# Patient Record
Sex: FEMALE | Race: WHITE
[De-identification: names, ages, dates, MRNs, and addresses within clinical notes are randomized per-mention and may not be internally consistent; named-entity substitution may affect disease eponyms.]

---

## 2017-02-20 ENCOUNTER — HOSPITAL ENCOUNTER (EMERGENCY)
Dept: HOSPITAL 61 - ER | Age: 47
Discharge: HOME | End: 2017-02-20
Payer: COMMERCIAL

## 2017-02-20 VITALS — DIASTOLIC BLOOD PRESSURE: 78 MMHG | SYSTOLIC BLOOD PRESSURE: 118 MMHG

## 2017-02-20 VITALS — WEIGHT: 150 LBS | BODY MASS INDEX: 24.99 KG/M2 | HEIGHT: 65 IN
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
## 2017-02-20 DIAGNOSIS — S06.0X9A: Primary | ICD-10-CM

## 2017-02-20 DIAGNOSIS — M25.532: ICD-10-CM
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
## 2017-02-20 DIAGNOSIS — N39.0: ICD-10-CM
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
## 2017-02-20 DIAGNOSIS — E03.9: ICD-10-CM

## 2017-02-20 DIAGNOSIS — Z90.710: ICD-10-CM

## 2017-02-20 DIAGNOSIS — S63.502A: ICD-10-CM

## 2017-02-20 LAB
BACTERIA #/AREA URNS HPF: (no result) /HPF
BILIRUB UR QL STRIP: NEGATIVE
GLUCOSE UR STRIP-MCNC: NEGATIVE MG/DL
NITRITE UR QL STRIP: POSITIVE
PH UR STRIP: 6 [PH]
PROT UR STRIP-MCNC: NEGATIVE MG/DL
RBC #/AREA URNS HPF: (no result) /HPF (ref 0–2)
SP GR UR STRIP: 1.01
SQUAMOUS #/AREA URNS LPF: (no result) /LPF
UROBILINOGEN UR-MCNC: 0.2 MG/DL

## 2017-02-20 PROCEDURE — 70450 CT HEAD/BRAIN W/O DYE: CPT

## 2017-02-20 PROCEDURE — 81025 URINE PREGNANCY TEST: CPT

## 2017-02-20 PROCEDURE — 73110 X-RAY EXAM OF WRIST: CPT

## 2017-02-20 PROCEDURE — 87186 SC STD MICRODIL/AGAR DIL: CPT

## 2017-02-20 PROCEDURE — 81001 URINALYSIS AUTO W/SCOPE: CPT

## 2017-02-20 PROCEDURE — 70486 CT MAXILLOFACIAL W/O DYE: CPT

## 2017-02-20 PROCEDURE — 87086 URINE CULTURE/COLONY COUNT: CPT

## 2017-02-20 NOTE — RAD
Left wrist, 3 views, 2/20/2017:



History: Fall, pain



No fracture or dislocation is identified. There is mild subcutaneous edema.



IMPRESSION: No acute bony abnormality is detected.

## 2017-02-20 NOTE — PHYS DOC
Past Medical History


Past Medical History:  Hypothyroid


Past Surgical History:  Hysterectomy, Other


Additional Past Surgical Histo:  Thyroidectomy.


Alcohol Use:  None


Drug Use:  None





Adult General


Chief Complaint


Chief Complaint:  FACE PROBLEM





HPI


HPI


Patient is a 46  year old female presents emergency department stating that she 

was going up her stairs at home last week when she fell hitting the left side 

of her face. She believes she had lost consciousness as she does not remember 

picking up things and making it back into the house. Patient states she's 

continued to have headaches. She states that she's had bruising to the left 

side of her face. She's been taken ibuprofen for the pain and discomfort. She 

denies any use of blood thinners. Patient denies any neck pain or discomfort. 

She does state that she has some urinary frequency urgency and pain with 

urination. She is also claiming that she is having left wrist pain and 

discomfort. Patient denies placing ice packs on the left wrist. Patient denies 

any vaginal discharge. Patient denies any fever, chills or any nausea vomiting.





Review of Systems


Review of Systems





Constitutional: Denies fever or chills []


Eyes: Denies change in visual acuity, redness, or eye pain []


HENT: Denies nasal congestion or sore throat. Bruising left side of face


Respiratory: Denies cough or shortness of breath []


Cardiovascular: No additional information not addressed in HPI []


GI: Denies abdominal pain, nausea, vomiting, bloody stools or diarrhea []


:  dysuria denies hematuria []


Musculoskeletal: Denies back pain or joint pain []


Integument: Denies rash or skin lesions []


Neurologic:  headache, denies focal weakness or sensory changes []





Allergies


Allergies





 Allergies








Coded Allergies Type Severity Reaction Last Updated Verified


 


  No Known Drug Allergies    8/10/15 No











Physical Exam


Physical Exam





Constitutional: Well developed, well nourished, no acute distress, non-toxic 

appearance. []


HENT: Normocephalic, atraumatic, bilateral external ears normal, oropharynx 

moist, no oral exudates, nose normal. Bilateral tympanic membranes appear to be 

normal. Patient is able to open and close her mouth without difficulty.


Eyes: PERRLA, EOMI, conjunctiva normal, no discharge. [] 


Neck: Normal range of motion, no tenderness, supple, no stridor. [] 


Cardiovascular:Heart rate regular rhythm, no murmur []


Lungs & Thorax:  Bilateral breath sounds clear to auscultation []


Skin: Warm, dry, no erythema, no rash. [] 


Back: No tenderness] 


Extremities: Left wrist tenderness, no cyanosis, no clubbing, ROM intact, no 

edema. Patient was noted to have left wrist tenderness on the radial side. No 

bruising discoloration or deformity noted. Peripheral pulses 2+ cap refill 

brisk less than 2 seconds.


Neurologic: Alert and oriented X 3, normal motor function, normal sensory 

function, no focal deficits noted. []


Psychologic: Affect normal, judgement normal, mood normal. []





Current Patient Data


Vital Signs





 Vital Signs








  Date Time  Temp Pulse Resp B/P Pulse Ox O2 Delivery O2 Flow Rate FiO2


 


17 13:20 98.0 61 16  100 Room Air  





 98.0       








Lab Values





 Laboratory Tests








Test


  17


13:30 17


13:58


 


Urine Color Yellow   


 


Urine Clarity Clear   


 


Urine pH 6.0   


 


Urine Specific Gravity 1.010   


 


Urine Protein


  Negativemg/dL


(NEG-TRACE) 


 


 


Urine Glucose (UA)


  Negativemg/dL


(NEG) 


 


 


Urine Ketones (Stick)


  Negativemg/dL


(NEG) 


 


 


Urine Blood Small (NEG)   


 


Urine Nitrite


  Positive (NEG)


  


 


 


Urine Bilirubin


  Negative (NEG)


  


 


 


Urine Urobilinogen Dipstick


  0.2mg/dL (0.2


mg/dL) 


 


 


Urine Leukocyte Esterase Large (NEG)   


 


Urine RBC Occ/HPF (0-2)   


 


Urine WBC


  11-20/HPF


(0-4) 


 


 


Urine Squamous Epithelial


Cells Occ/LPF  


  


 


 


Urine Bacteria


  Many/HPF


(0-FEW) 


 


 


POC Urine HCG, Qualitative


  


  Hcg negative


(Negative)











EKG


EKG


[]





Radiology/Procedures


Radiology/Procedures


[facility]


 [address] [place] [address]


 [phone]


 


 IMAGING REPORT





 Signed





PATIENT: ROWAN ECHOLS ACCOUNT: [id number] MRN#: [id number]


: 1970 LOCATION: ER AGE: 46


SEX: F EXAM DT: 17 ACCESSION#: [id number]


STATUS: REG ER ORD. PHYSICIAN: NICCI LEE NP 


REASON: fell on steps last week bruising to left side of face


PROCEDURE: HEAD AND MAXILLOFACIAL WO

















PQRS Compliance Statement:





One or more of the following individualized dose reduction techniques were


utilized for this examination:


1. Automated exposure control


2. Adjustment of the mA and/or kV according to patient size


3. Use of iterative reconstruction technique








CT of the head without contrast, 2017:





History: Fall, head trauma, facial bruising





Comparison is made to a study from 2018. The ventricles are within normal


limits in size. There is no shift of the midline structures. There is no


evidence of acute intracranial hemorrhage or mass effect.





IMPRESSION: No acute intracranial abnormality is detected.











CT of the facial bones without contrast, 2017:





Noncontrast scans were obtained with multiplanar reconstructions produced.





No fracture or dislocation is identified. No free fluid is present in the


sinuses. There is mild mucosal thickening along the floor of the left


maxillary sinus. The orbital contents are unremarkable.





IMPRESSION: No significant facial bone abnormality is detected.

















DICTATED and SIGNED BY:     MORITZ,RICK S MD


DATE:     17 1440





CC: NICCI LEE NP; DAILY LUU MD ~


[address] [address]


 [phone]


 


 IMAGING REPORT





 Signed





PATIENT: ROWAN ECHOLS ACCOUNT: [id number] MRN#: [id number]


: 1970 LOCATION: ER AGE: 46


SEX: F EXAM DT: 17 ACCESSION#: [id number]


STATUS: REG ER ORD. PHYSICIAN: NICCI LEE NP 


REASON: fell on steps last week, hurts on and off.


PROCEDURE: WRIST 3V LEFT











Left wrist, 3 views, 2017:





History: Fall, pain





No fracture or dislocation is identified. There is mild subcutaneous edema.





IMPRESSION: No acute bony abnormality is detected.

















DICTATED and SIGNED BY:     MORITZ,RICK S MD


DATE:     17 1405





CC: NICCI LEE NP; DAILY LUU MD ~


[]





Course & Med Decision Making


Course & Med Decision Making


Pertinent Labs and Imaging studies reviewed. (See chart for details)


CT scan negative, left wrist negative. Urine positive for UTI. Patient will be 

placed on macrobid. Recommended plenty of water, cranberry juice, avoid 

cranberry juice cocktail, carbonated beverages, caffeine. alcohol and citrus 

fruits as these are consider irritants to the bladder. Followup with primary 

care provider in 5-7 days/Signs and symptoms to return to the emergency 

department has been provided. Patient agrees with discharge instructions, 

treatment regimen and followup recommendations.


[]





Dragon Disclaimer


Dragon Disclaimer


This electronic medical record was generated, in whole or in part, using a 

voice recognition dictation system.





Departure


Departure


Impression:  


 Primary Impression:  


 Closed head injury with concussion


 Additional Impressions:  


 Left wrist sprain


 Urinary tract infection


Disposition:  01 HOME, SELF-CARE


Condition:  STABLE


Referrals:  


DAILY LUU MD (PCP)


Patient Instructions:  Concussion and Brain Injury, Easy-to-Read, Head Injury, 

Adult, Easy-to-Read, Urinary Tract Infection, Easy-to-Read, Wrist Pain, Easy-to-

Read





Additional Instructions:


Activity as tolerated


Medication as prescribed


Tylenol or Ibuprofen for fever, chills, or generalized body aches


Drink plenty of water and cranberry juice


Avoid cranberry juice cocktail, carbonated beverages, caffeine, alcohol and 

citrus fruits as these are considered to be irritants to the bladder


Followup with your primary care provider in 5-7 days


Return to emergency department as needed for signs and symptoms that become 

worse.


Scripts


Nitrofurantoin Monohyd/M-Cryst (Macrobid 100 Mg Capsule)100 Mg Capsule1 Cap PO 

BID #14 CAP


   Prov:NICCI LEE NP         17





Problem Qualifiers








NICCI LEE NP 2017 13:48

## 2017-02-20 NOTE — RAD
PQRS Compliance Statement:



One or more of the following individualized dose reduction techniques were

utilized for this examination:

1. Automated exposure control

2. Adjustment of the mA and/or kV according to patient size

3. Use of iterative reconstruction technique





CT of the head without contrast, 2/20/2017:



History: Fall, head trauma, facial bruising



Comparison is made to a study from 7/18/2018. The ventricles are within normal

limits in size. There is no shift of the midline structures. There is no

evidence of acute intracranial hemorrhage or mass effect.



IMPRESSION: No acute intracranial abnormality is detected.







CT of the facial bones without contrast, 2/20/2017:



Noncontrast scans were obtained with multiplanar reconstructions produced.



No fracture or dislocation is identified. No free fluid is present in the

sinuses. There is mild mucosal thickening along the floor of the left

maxillary sinus. The orbital contents are unremarkable.



IMPRESSION: No significant facial bone abnormality is detected.

## 2017-09-25 ENCOUNTER — HOSPITAL ENCOUNTER (EMERGENCY)
Dept: HOSPITAL 61 - ER | Age: 47
Discharge: HOME | End: 2017-09-25
Payer: COMMERCIAL

## 2017-09-25 VITALS
DIASTOLIC BLOOD PRESSURE: 68 MMHG | DIASTOLIC BLOOD PRESSURE: 68 MMHG | SYSTOLIC BLOOD PRESSURE: 107 MMHG | SYSTOLIC BLOOD PRESSURE: 107 MMHG

## 2017-09-25 VITALS — BODY MASS INDEX: 25.83 KG/M2 | WEIGHT: 155 LBS | HEIGHT: 65 IN

## 2017-09-25 DIAGNOSIS — N39.0: ICD-10-CM

## 2017-09-25 DIAGNOSIS — Z85.850: ICD-10-CM
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
## 2017-09-25 DIAGNOSIS — S06.0X1A: Primary | ICD-10-CM
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
## 2017-09-25 DIAGNOSIS — E89.0: ICD-10-CM

## 2017-09-25 LAB
ANION GAP SERPL CALC-SCNC: 7 MMOL/L (ref 6–14)
BACTERIA #/AREA URNS HPF: (no result) /HPF
BASOPHILS # BLD AUTO: 0 X10^3/UL (ref 0–0.2)
BASOPHILS NFR BLD: 1 % (ref 0–3)
BILIRUB UR QL STRIP: NEGATIVE
BUN SERPL-MCNC: 10 MG/DL (ref 7–20)
CALCIUM SERPL-MCNC: 8.8 MG/DL (ref 8.5–10.1)
CHLORIDE SERPL-SCNC: 103 MMOL/L (ref 98–107)
CO2 SERPL-SCNC: 31 MMOL/L (ref 21–32)
CREAT SERPL-MCNC: 0.7 MG/DL (ref 0.6–1)
EOSINOPHIL NFR BLD: 1 % (ref 0–3)
ERYTHROCYTE [DISTWIDTH] IN BLOOD BY AUTOMATED COUNT: 14.6 % (ref 11.5–14.5)
GFR SERPLBLD BASED ON 1.73 SQ M-ARVRAT: 89.3 ML/MIN
GLUCOSE SERPL-MCNC: 90 MG/DL (ref 70–99)
GLUCOSE UR STRIP-MCNC: NEGATIVE MG/DL
HCT VFR BLD CALC: 38.3 % (ref 36–47)
HGB BLD-MCNC: 12.8 G/DL (ref 12–15.5)
LYMPHOCYTES # BLD: 1.6 X10^3/UL (ref 1–4.8)
LYMPHOCYTES NFR BLD AUTO: 33 % (ref 24–48)
MCH RBC QN AUTO: 31 PG (ref 25–35)
MCHC RBC AUTO-ENTMCNC: 34 G/DL (ref 31–37)
MCV RBC AUTO: 94 FL (ref 79–100)
MONOCYTES NFR BLD: 5 % (ref 0–9)
NEUTROPHILS NFR BLD AUTO: 59 % (ref 31–73)
NITRITE UR QL STRIP: POSITIVE
PH UR STRIP: 5.5 [PH]
PLATELET # BLD AUTO: 239 X10^3/UL (ref 140–400)
POTASSIUM SERPL-SCNC: 3.3 MMOL/L (ref 3.5–5.1)
PROT UR STRIP-MCNC: NEGATIVE MG/DL
RBC # BLD AUTO: 4.1 X10^6/UL (ref 3.5–5.4)
RBC #/AREA URNS HPF: 0 /HPF (ref 0–2)
SODIUM SERPL-SCNC: 141 MMOL/L (ref 136–145)
SP GR UR STRIP: 1.01
SQUAMOUS #/AREA URNS LPF: (no result) /LPF
UROBILINOGEN UR-MCNC: 0.2 MG/DL
WBC # BLD AUTO: 4.7 X10^3/UL (ref 4–11)
WBC #/AREA URNS HPF: >40 /HPF (ref 0–4)

## 2017-09-25 PROCEDURE — 85025 COMPLETE CBC W/AUTO DIFF WBC: CPT

## 2017-09-25 PROCEDURE — 70450 CT HEAD/BRAIN W/O DYE: CPT

## 2017-09-25 PROCEDURE — 80048 BASIC METABOLIC PNL TOTAL CA: CPT

## 2017-09-25 PROCEDURE — 36415 COLL VENOUS BLD VENIPUNCTURE: CPT

## 2017-09-25 PROCEDURE — 81001 URINALYSIS AUTO W/SCOPE: CPT

## 2017-09-25 PROCEDURE — 87086 URINE CULTURE/COLONY COUNT: CPT

## 2017-09-25 PROCEDURE — 72125 CT NECK SPINE W/O DYE: CPT

## 2017-09-25 NOTE — RAD
EXAM: 

1. CT head without contrast.

2. CT cervical spine without contrast.



HISTORY: Head trauma, vertigo.



TECHNIQUE: Computed tomography of the head and cervical spine was performed

without intravenous contrast.



COMPARISON: 2/20/2017.



FINDINGS: There is no intracranial hemorrhage. Gray-white differentiation is

preserved. The ventricles are normal in size and position.    



The visualized paranasal sinuses appear clear. The orbits are unremarkable.

The temporal bones are unremarkable. The calvarium reveals no suspicious

lesions. There is a small moderate left parietal scalp hematoma.



Cervical alignment is normal. The craniocervical junction is unremarkable.

There are mild degenerative changes at C1-2. Intervertebral disc heights are

maintained. There is no prevertebral soft tissue swelling. No fractures are

identified.



There is no clear central canal stenosis or foraminal stenosis.



IMPRESSION:

1. No acute intracranial findings. Small to moderate left parietal scalp

hematoma.

2. No cervical fracture or malalignment.





*One or more of the following individualized dose reduction techniques were

utilized for this examination:  

1. Automated exposure control.  

2. Adjustment of the mA and/or kV according to patient size.  

3. Use of iterative reconstruction technique.
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
## 2018-07-16 ENCOUNTER — HOSPITAL ENCOUNTER (OUTPATIENT)
Dept: HOSPITAL 61 - KCIC MAMMO | Age: 48
Discharge: HOME | End: 2018-07-16
Attending: FAMILY MEDICINE
Payer: COMMERCIAL

## 2018-07-16 DIAGNOSIS — Z12.31: Primary | ICD-10-CM

## 2018-07-16 PROCEDURE — 77067 SCR MAMMO BI INCL CAD: CPT

## 2020-03-13 ENCOUNTER — HOSPITAL ENCOUNTER (OUTPATIENT)
Dept: HOSPITAL 61 - KCIC MAMMO | Age: 50
Discharge: HOME | End: 2020-03-13
Attending: FAMILY MEDICINE
Payer: COMMERCIAL

## 2020-03-13 DIAGNOSIS — N64.89: ICD-10-CM

## 2020-03-13 DIAGNOSIS — Z12.31: Primary | ICD-10-CM

## 2020-03-13 PROCEDURE — 77067 SCR MAMMO BI INCL CAD: CPT

## 2020-03-13 NOTE — [facility]
Bilateral digital screening mammograms:

 

Reason for examination: Routine screening.

 

Comparison is made to previous studies dated 7/16/2018 and 6/16/2014.

 

Interpretation was made with the benefit of CAD.

 

The skin and nipples show no abnormalities. No abnormal axillary lymph 

nodes are seen. The breast parenchyma shows scattered fibroglandular 

density. (Breast density: Category B.) There continues be some mild 

parenchymal asymmetry in the lateral right breast which is stable. There 

are no new dominant masses, suspicious calcifications or architectural 

distortions.

 

Impression:

 

No evidence of malignancy. Recommend routine screening.

 

BI-RADS Category 2: Benign.

 

"Our facility is accredited by the [employer] 

Mammography Program."

 

This patient's information has been entered into a reminder system for the

patient to be notified with the results of her examination and a target 

date for the next mammogram.

 

Electronically signed by: Coreen Turner MD (3/13/2020 3:29 PM) [email or web address]